# Patient Record
Sex: FEMALE
[De-identification: names, ages, dates, MRNs, and addresses within clinical notes are randomized per-mention and may not be internally consistent; named-entity substitution may affect disease eponyms.]

---

## 2024-01-25 ENCOUNTER — DASHBOARD ENCOUNTERS (OUTPATIENT)
Age: 54
End: 2024-01-25

## 2024-01-25 ENCOUNTER — OFFICE VISIT (OUTPATIENT)
Dept: URBAN - METROPOLITAN AREA CLINIC 113 | Facility: CLINIC | Age: 54
End: 2024-01-25
Payer: OTHER GOVERNMENT

## 2024-01-25 VITALS
HEIGHT: 68 IN | RESPIRATION RATE: 18 BRPM | BODY MASS INDEX: 38.04 KG/M2 | TEMPERATURE: 97.5 F | HEART RATE: 102 BPM | WEIGHT: 251 LBS | DIASTOLIC BLOOD PRESSURE: 94 MMHG | SYSTOLIC BLOOD PRESSURE: 144 MMHG

## 2024-01-25 DIAGNOSIS — K51.211 ULCERATIVE PROCTITIS WITH RECTAL BLEEDING: ICD-10-CM

## 2024-01-25 DIAGNOSIS — K62.5 RECTAL BLEEDING: ICD-10-CM

## 2024-01-25 DIAGNOSIS — R13.19 ESOPHAGEAL DYSPHAGIA: ICD-10-CM

## 2024-01-25 DIAGNOSIS — R93.5 ABNORMAL CT OF THE ABDOMEN: ICD-10-CM

## 2024-01-25 PROBLEM — 12063002: Status: ACTIVE | Noted: 2024-01-25

## 2024-01-25 PROBLEM — 15634181000119107: Status: ACTIVE | Noted: 2024-01-25

## 2024-01-25 PROBLEM — 10811000202109: Status: ACTIVE | Noted: 2024-01-25

## 2024-01-25 PROBLEM — 40890009: Status: ACTIVE | Noted: 2024-01-25

## 2024-01-25 PROBLEM — 429047008: Status: ACTIVE | Noted: 2024-01-25

## 2024-01-25 PROCEDURE — 99204 OFFICE O/P NEW MOD 45 MIN: CPT | Performed by: INTERNAL MEDICINE

## 2024-01-25 RX ORDER — ONDANSETRON HYDROCHLORIDE 8 MG/1
1 TABLET AS NEEDED TABLET, FILM COATED ORAL ONCE A DAY
Status: ACTIVE | COMMUNITY

## 2024-01-25 RX ORDER — OMEPRAZOLE 40 MG/1
1 CAPSULE 30 MINUTES BEFORE MORNING MEAL CAPSULE, DELAYED RELEASE ORAL ONCE A DAY
Status: ACTIVE | COMMUNITY

## 2024-01-25 RX ORDER — MESALAMINE 1.2 G/1
2 TABLETS WITH A MEAL TABLET, DELAYED RELEASE ORAL ONCE A DAY
Status: ACTIVE | COMMUNITY

## 2024-01-25 NOTE — HPI-TODAY'S VISIT:
53-year-old with a history of chronic diarrhea and rectal bleeding follows with Dr. Garrido who presents for a second opinion regarding CT scan results.  She brings her records with her and I reviewed the extensive records that she brought with her.  She has been having trouble with her bowels occurring 4-5 times a day and sometimes at night.  Sometimes they are liquid sometimes they can be soft.  She has blood with every bowel movement.  There is been no fever.  She does have nausea intermittently that can occur at any time.  She has vomited once in the last 3 months.  This has been more often in the last 3 months but she has had symptoms since 2019.  In addition she has some dysphagia to solids and liquids where food is slow to go down.  This has been off and on again for about 3 months.  She denies any heartburn.  She gets some lower abdominal cramping.  She denies any eye pain.  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  There is no new skin rashes.  She denies any dysuria or hematuria. She had a colonoscopy by Dr. Garrido on 8/16/2019 that revealed diffuse nodularity of the terminal ileum biopsies revealed ileal mucosa with no diagnostic abnormality.  There is medium size internal hemorrhoids the colon was otherwise normal with no evidence of inflammation and random biopsies revealed no significant abnormality. Repeat colonoscopy examination on 3/23/2023 for rectal bleeding revealed nodularity in the terminal ileum biopsies were obtained this revealed ileal mucosa with no pathologic abnormality.  There was diffuse erythema and congestion in the distal rectum biopsies showed moderate active chronic proctitis, there was small internal hemorrhoids.  There was a 5 mm tubular adenoma in the ascending colon. CT scan of the abdomen pelvis with contrast on 10/25/2023 revealed a small paraesophageal hiatal hernia.  There is a left adrenal mass 2.5 x 1.6 x 2.3 cm in size that is homogeneous.  There is a fat filled umbilical hernia that is small.  There is circumferential thickening of the rectum with heterogeneous hyperattenuation within the rectum possibly stool or blood products.  There is focal asymmetric rectal mucosal thickening and prominent mesorectal lymph nodes largest being 1.2 cm.  There is hyperintense attenuation of the mucosa.  Blood work on 9/18/2023 revealed a WBC of 8.8, hemoglobin 12 and platelet count 256,000.  Sodium 141 potassium 3.8 BUN 8 creatinine 0.8.  Albumin is 3.2, total bili 0.5 AST 17, ALT 24 alkaline phosphatase 65 total bili 0.5.  Vitamin B12 is 457.  Hemoglobin A1c is 6.5.  Iron saturation is 14.6% and ferritin is 32.  TSH is 2.015.